# Patient Record
Sex: MALE | Race: WHITE | NOT HISPANIC OR LATINO | URBAN - METROPOLITAN AREA
[De-identification: names, ages, dates, MRNs, and addresses within clinical notes are randomized per-mention and may not be internally consistent; named-entity substitution may affect disease eponyms.]

---
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
## 2023-08-22 ENCOUNTER — APPOINTMENT (OUTPATIENT)
Dept: OTOLARYNGOLOGY | Facility: CLINIC | Age: 23
End: 2023-08-22
Payer: COMMERCIAL

## 2023-08-22 VITALS
WEIGHT: 154 LBS | DIASTOLIC BLOOD PRESSURE: 74 MMHG | HEART RATE: 51 BPM | TEMPERATURE: 98.3 F | SYSTOLIC BLOOD PRESSURE: 116 MMHG | BODY MASS INDEX: 20.86 KG/M2 | OXYGEN SATURATION: 98 % | HEIGHT: 72 IN

## 2023-08-22 DIAGNOSIS — H61.21 IMPACTED CERUMEN, RIGHT EAR: ICD-10-CM

## 2023-08-22 DIAGNOSIS — Z78.9 OTHER SPECIFIED HEALTH STATUS: ICD-10-CM

## 2023-08-22 DIAGNOSIS — J34.3 HYPERTROPHY OF NASAL TURBINATES: ICD-10-CM

## 2023-08-22 DIAGNOSIS — J34.2 DEVIATED NASAL SEPTUM: ICD-10-CM

## 2023-08-22 DIAGNOSIS — R09.81 NASAL CONGESTION: ICD-10-CM

## 2023-08-22 PROBLEM — Z00.00 ENCOUNTER FOR PREVENTIVE HEALTH EXAMINATION: Status: ACTIVE | Noted: 2023-08-22

## 2023-08-22 PROCEDURE — 99204 OFFICE O/P NEW MOD 45 MIN: CPT | Mod: 25

## 2023-08-22 PROCEDURE — 31231 NASAL ENDOSCOPY DX: CPT

## 2023-08-22 PROCEDURE — 69210 REMOVE IMPACTED EAR WAX UNI: CPT | Mod: RT

## 2023-08-22 RX ORDER — FLUTICASONE PROPIONATE 50 UG/1
50 SPRAY, METERED NASAL DAILY
Qty: 2 | Refills: 3 | Status: ACTIVE | COMMUNITY
Start: 2023-08-22 | End: 1900-01-01
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
## 2023-08-22 NOTE — PHYSICAL EXAM
[Normal] : mucosa is normal [Midline] : trachea located in midline position [Nasal Endoscopy Performed] : nasal endoscopy was performed, see procedure section for findings [] : septum deviated bilaterally [de-identified] : + Turbinate hypertrophy

## 2023-08-22 NOTE — PROCEDURE
[FreeTextEntry3] : - Cerumen Removal/Ear Cleaning for Otitis Externa Pre-operative Diagnosis: Right Cerumen Impaction Post-operative Diagnosis: Same Procedure:  Binocular microscopy with cerumen removal- [id number] Procedure Details:   The patient was placed in the supine position.  The operating microscope was positioned.  I then placed the ear speculum in the Right EAC.  Cerumen was then removed using a mixture of otologic curettes, and suction.  The TM was noted to be intact.  The patient tolerated procedure well.  Findings:  Bilateral Ear Canal - normal Bilateral Tympanic Membrane - normal  Recommendations: Debrox Complications: None  [FreeTextEntry6] : - Procedure Note    Pre-operative Diagnosis: nasal congestion Post-operative Diagnosis: septal deviation, turbinate hypertrophy Anesthesia: Topical Procedure: Bilateral nasal endoscopy    Procedure Details:  After topical anesthesia and decongestant, the patient was placed in the supine position. The telescope was passed along the left nasal floor to the nasopharynx. It was then passed into the region of the middle meatus, middle turbinate, and the sphenoethmoid region.  An identical procedure was performed on the right side.     Findings:  Mucosa: 	                normal	 Nasal septum: 	S-shaped septal deviation Discharge: 	none	 Turbinates: 	Hypertrophy bilaterally Adenoid: 	                normal	 Posterior choanae: 	normal	 Eustachian tubes: 	normal	 Mucous stranding: 	normal 	 Lesions: 	                Not present	    Comments:  Condition: Stable. Patient tolerated procedure well. Complications: None

## 2023-08-22 NOTE — ASSESSMENT
[FreeTextEntry1] : 22-year-old gentleman who presents with concern for nasal congestion.  On exam there is evidence of septal deviation and turbinate hypertrophy.  At this time I am recommending conservative treatment including nasal saline irrigation as well as nasal steroids.  Patient will follow-up in 6 to 8 weeks to see if there is any improvement.  If not he may benefit from surgical intervention including septoplasty and turbinectomy.  He knows he can follow-up sooner should symptoms worsen or fail to improve.  - Nasal saline, nasal steroids - Follow-up in 6 to 8 weeks - If symptoms persist would consider septoplasty and turbinectomy

## 2025-05-16 ENCOUNTER — APPOINTMENT (OUTPATIENT)
Dept: ORTHOPEDIC SURGERY | Facility: CLINIC | Age: 25
End: 2025-05-16
Payer: COMMERCIAL

## 2025-05-16 DIAGNOSIS — M25.561 PAIN IN RIGHT KNEE: ICD-10-CM

## 2025-05-16 DIAGNOSIS — Z78.9 OTHER SPECIFIED HEALTH STATUS: ICD-10-CM

## 2025-05-16 DIAGNOSIS — M25.372 OTHER INSTABILITY, LEFT ANKLE: ICD-10-CM

## 2025-05-16 DIAGNOSIS — M67.461 GANGLION, RIGHT KNEE: ICD-10-CM

## 2025-05-16 DIAGNOSIS — S86.111A STRAIN OF OTHER MUSCLE(S) AND TENDON(S) OF POSTERIOR MUSCLE GROUP AT LOWER LEG LEVEL, RIGHT LEG, INITIAL ENCOUNTER: ICD-10-CM

## 2025-05-16 DIAGNOSIS — M25.371 OTHER INSTABILITY, RIGHT ANKLE: ICD-10-CM

## 2025-05-16 PROCEDURE — 99204 OFFICE O/P NEW MOD 45 MIN: CPT

## 2025-06-13 ENCOUNTER — APPOINTMENT (OUTPATIENT)
Dept: ORTHOPEDIC SURGERY | Facility: CLINIC | Age: 25
End: 2025-06-13

## 2025-06-26 ENCOUNTER — APPOINTMENT (OUTPATIENT)
Dept: ORTHOPEDIC SURGERY | Facility: CLINIC | Age: 25
End: 2025-06-26
Payer: COMMERCIAL

## 2025-06-26 PROBLEM — S86.111A: Status: RESOLVED | Noted: 2025-05-16 | Resolved: 2025-06-26

## 2025-06-26 PROBLEM — M25.561 ACUTE PAIN OF RIGHT KNEE: Status: RESOLVED | Noted: 2025-05-16 | Resolved: 2025-06-26

## 2025-06-26 PROBLEM — S93.401S SPRAIN OF RIGHT ANKLE, UNSPECIFIED LIGAMENT, SEQUELA: Status: ACTIVE | Noted: 2025-06-26

## 2025-06-26 PROBLEM — S93.402S SPRAIN OF LEFT ANKLE, UNSPECIFIED LIGAMENT, SEQUELA: Status: ACTIVE | Noted: 2025-06-26

## 2025-06-26 PROBLEM — M25.571 CHRONIC PAIN OF BOTH ANKLES: Status: ACTIVE | Noted: 2025-06-26

## 2025-06-26 PROCEDURE — 99214 OFFICE O/P EST MOD 30 MIN: CPT

## 2025-06-26 PROCEDURE — 73630 X-RAY EXAM OF FOOT: CPT | Mod: 50

## 2025-06-26 PROCEDURE — 73600 X-RAY EXAM OF ANKLE: CPT | Mod: 50

## 2025-08-11 ENCOUNTER — RESULT REVIEW (OUTPATIENT)
Age: 25
End: 2025-08-11

## 2025-08-12 ENCOUNTER — OUTPATIENT (OUTPATIENT)
Dept: OUTPATIENT SERVICES | Facility: HOSPITAL | Age: 25
LOS: 1 days | End: 2025-08-12
Payer: COMMERCIAL

## 2025-08-12 ENCOUNTER — APPOINTMENT (OUTPATIENT)
Dept: MRI IMAGING | Facility: HOSPITAL | Age: 25
End: 2025-08-12
Payer: COMMERCIAL

## 2025-08-12 ENCOUNTER — RESULT REVIEW (OUTPATIENT)
Age: 25
End: 2025-08-12

## 2025-08-12 DIAGNOSIS — M25.571 PAIN IN RIGHT ANKLE AND JOINTS OF RIGHT FOOT: ICD-10-CM

## 2025-08-12 DIAGNOSIS — S93.401S SPRAIN OF UNSPECIFIED LIGAMENT OF RIGHT ANKLE, SEQUELA: ICD-10-CM

## 2025-08-12 DIAGNOSIS — S93.402S SPRAIN OF UNSPECIFIED LIGAMENT OF LEFT ANKLE, SEQUELA: ICD-10-CM

## 2025-08-12 PROCEDURE — 73721 MRI JNT OF LWR EXTRE W/O DYE: CPT | Mod: 26,RT

## 2025-08-12 PROCEDURE — 73721 MRI JNT OF LWR EXTRE W/O DYE: CPT

## 2025-08-15 ENCOUNTER — NON-APPOINTMENT (OUTPATIENT)
Age: 25
End: 2025-08-15

## 2025-08-22 ENCOUNTER — APPOINTMENT (OUTPATIENT)
Dept: ORTHOPEDIC SURGERY | Facility: CLINIC | Age: 25
End: 2025-08-22
Payer: COMMERCIAL

## 2025-08-22 DIAGNOSIS — M25.571 PAIN IN RIGHT ANKLE AND JOINTS OF RIGHT FOOT: ICD-10-CM

## 2025-08-22 DIAGNOSIS — G89.29 PAIN IN RIGHT ANKLE AND JOINTS OF RIGHT FOOT: ICD-10-CM

## 2025-08-22 DIAGNOSIS — M25.371 OTHER INSTABILITY, RIGHT ANKLE: ICD-10-CM

## 2025-08-22 DIAGNOSIS — M25.372 OTHER INSTABILITY, LEFT ANKLE: ICD-10-CM

## 2025-08-22 DIAGNOSIS — S93.402S SPRAIN OF UNSPECIFIED LIGAMENT OF LEFT ANKLE, SEQUELA: ICD-10-CM

## 2025-08-22 DIAGNOSIS — S93.401S SPRAIN OF UNSPECIFIED LIGAMENT OF RIGHT ANKLE, SEQUELA: ICD-10-CM

## 2025-08-22 DIAGNOSIS — M25.572 PAIN IN RIGHT ANKLE AND JOINTS OF RIGHT FOOT: ICD-10-CM

## 2025-08-22 PROCEDURE — 99213 OFFICE O/P EST LOW 20 MIN: CPT | Mod: 3W
